# Patient Record
Sex: FEMALE | Race: WHITE | NOT HISPANIC OR LATINO | Employment: UNEMPLOYED | URBAN - METROPOLITAN AREA
[De-identification: names, ages, dates, MRNs, and addresses within clinical notes are randomized per-mention and may not be internally consistent; named-entity substitution may affect disease eponyms.]

---

## 2020-01-15 NOTE — PATIENT DISCUSSION
Fuchs' Endothelial Dystrophy Counseling: The diagnosis and its pathophysiology was explained to the patient. As long as the patient is asymptomatic, no treatment is necessary. The patient was advised of the possibility of decreased vision over time secondary to corneal edema. The patient was advised to return for further evaluation if they have fluctuating vision and/or a foreign body sensation.

## 2020-01-15 NOTE — PATIENT DISCUSSION
Posterior Capsular Fibrosis Counseling: The diagnosis of posterior capsular fibrosis (PCF), also referred to as a secondary cataract or posterior capsular opacification (PCO), was discussed with the patient. The patient understands that their symptoms and limitations are likely related to this condition. I have reviewed the risks, benefits and alternatives of  YAG laser surgery for the treatment of the fibrosis. The uncommon risk of an increase in intraocular pressure or a retinal detachment and their associated symptoms were explained to the patient.   The patient understands and desires to proceed with the laser surgery to improve their vision for __DRIVE SAFELY__.

## 2020-01-15 NOTE — PATIENT DISCUSSION
New Prescription: erythromycin (erythromycin): ointment: 5 mg/gram (0.5 %) a small amount at bedtime on eyelid 01-

## 2020-02-05 NOTE — PATIENT DISCUSSION
Continue: erythromycin (erythromycin): ointment: 5 mg/gram (0.5 %) a small amount at bedtime on eyelid 01-

## 2020-02-05 NOTE — PATIENT DISCUSSION
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 Chronic tearing associated with NLDO may require surgical intervention for adequate tear drainage.

## 2020-02-05 NOTE — PATIENT DISCUSSION
Fuchs' Endothelial Dystrophy Counseling: The diagnosis and its pathophysiology was explained to the patient. The need for a corneal transplant to restore vision and prevent possible infections and/or pain have been explained.   Patient referred to corneal specialist.

## 2022-11-29 ENCOUNTER — NEW PATIENT (OUTPATIENT)
Dept: URBAN - METROPOLITAN AREA CLINIC 44 | Facility: CLINIC | Age: 27
End: 2022-11-29

## 2022-11-29 DIAGNOSIS — L98.8: ICD-10-CM

## 2022-11-29 PROCEDURE — 96999VB VOLBELLA

## 2022-11-29 RX ORDER — VALACYCLOVIR HYDROCHLORIDE 500 MG/1
1 TABLET, FILM COATED ORAL TWICE A DAY
Start: 2022-11-29 | End: 2022-12-09

## 2023-04-13 ENCOUNTER — CONSULTATION/EVALUATION (OUTPATIENT)
Dept: URBAN - METROPOLITAN AREA CLINIC 44 | Facility: CLINIC | Age: 28
End: 2023-04-13

## 2023-04-13 DIAGNOSIS — L98.8: ICD-10-CM

## 2023-04-13 PROCEDURE — 96999VB VOLBELLA
